# Patient Record
Sex: FEMALE | Race: WHITE | NOT HISPANIC OR LATINO | URBAN - METROPOLITAN AREA
[De-identification: names, ages, dates, MRNs, and addresses within clinical notes are randomized per-mention and may not be internally consistent; named-entity substitution may affect disease eponyms.]

---

## 2017-04-04 ENCOUNTER — ALLSCRIPTS OFFICE VISIT (OUTPATIENT)
Dept: OTHER | Facility: OTHER | Age: 37
End: 2017-04-04

## 2017-05-05 DIAGNOSIS — F41.1 GENERALIZED ANXIETY DISORDER: ICD-10-CM

## 2017-05-05 DIAGNOSIS — R00.2 PALPITATIONS: ICD-10-CM

## 2017-10-06 ENCOUNTER — ALLSCRIPTS OFFICE VISIT (OUTPATIENT)
Dept: OTHER | Facility: OTHER | Age: 37
End: 2017-10-06

## 2017-10-07 NOTE — PROGRESS NOTES
Assessment  1  Palpitations (785 1) (R00 2)   2  Generalized anxiety disorder (300 02) (F41 1)   3  Mixed hyperlipidemia (272 2) (E78 2)    Plan  Mixed hyperlipidemia    · Follow-up visit in 6 months Evaluation and Treatment  Follow-up  Status: Hold For -  Scheduling  Requested for: 35EVB3434   · Begin or continue regular aerobic exercise  Gradually work up to at least 3 sessions of  30 minutes of exercise a week ; Status:Complete;   Done: 13ADW0065 08:42AM   · Continue with our present treatment plan ; Status:Complete;   Done: 69NBZ9050 08:42AM   · We recommend that you follow the Mediterranean diet ; Status:Complete;   Done:  56SWV1069 08:42AM    Check Fasting lipid profile, CMP, CBC, TSH next visit     Discussion/Summary    Doing well, no change in mgt  Discussed stress relief modalities, importance of social network of support which she can access as needed  History of Present Illness  The patient states her hyperlipidemia has been stable since the last visit  She has no comorbid illnesses  She has no significant interval events  Symptoms:   Medications: The patient is not currently on any medications for her hyperlipidemia  The patient is being seen for follow-up of generalized anxiety disorder  The patient reports doing well  She has no comorbid illnesses  Interval symptoms:  improved anxiety,-denies difficulty concentrating,-denies restlessness,-denies panic attacks,-denies sleep disruption-and-denies depression  43 y/o WF for f/u [facility], Hyperlipidemia  Doing well, no acute c/o today  She reports having started to behave by improved diet and walking for exercise  She continues to b bothered by LBP  Stressors are stable, two teens at home, job is good      Review of Systems    Constitutional: No fever, no chills, feels well, no tiredness, no recent weight gain or weight loss  Eyes: No complaints of eye pain, no red eyes, no eyesight problems, no discharge, no dry eyes, no itching of eyes     ENT: no complaints of earache, no loss of hearing, no nose bleeds, no nasal discharge, no sore throat, no hoarseness  Cardiovascular: No complaints of slow heart rate, no fast heart rate, no chest pain, no palpitations, no leg claudication, no lower extremity edema  Respiratory: No complaints of shortness of breath, no wheezing, no cough, no SOB on exertion, no orthopnea, no PND  Gastrointestinal: No complaints of abdominal pain, no constipation, no nausea or vomiting, no diarrhea, no bloody stools  Genitourinary: No complaints of dysuria, no incontinence, no pelvic pain, no dysmenorrhea, no vaginal discharge or bleeding  Musculoskeletal: No complaints of arthralgias, no myalgias, no joint swelling or stiffness, no limb pain or swelling  Integumentary: No complaints of skin rash or lesions, no itching, no skin wounds, no breast pain or lump  Neurological: No complaints of headache, no confusion, no convulsions, no numbness, no dizziness or fainting, no tingling, no limb weakness, no difficulty walking  Psychiatric: Not suicidal, no sleep disturbance, no anxiety or depression, no change in personality, no emotional problems  Endocrine: No complaints of proptosis, no hot flashes, no muscle weakness, no deepening of the voice, no feelings of weakness  Hematologic/Lymphatic: No complaints of swollen glands, no swollen glands in the neck, does not bleed easily, does not bruise easily  ROS reviewed  Active Problems  1  Generalized anxiety disorder (300 02) (F41 1)   2  Palpitations (785 1) (R00 2)   3  Ulcerative colitis (556 9) (K51 90)    Past Medical History  1  History of herpes simplex infection (V12 09) (Z86 19)    The active problems and past medical history were reviewed and updated today  Surgical History  1  Denied: History Of Prior Surgery    The surgical history was reviewed and updated today  Family History  Mother    1  Family history of COPD, severe   2   Family history of diabetes mellitus (V18 0) (Z83 3)   3  Family history of hypercholesterolemia (V18 19) (Z83 42)   4  Family history of hypertension (V17 49) (Z82 49)  Father    5  Family history of Coronary artery disease    The family history was reviewed and updated today  Social History   · Full-time employment   · Lives with children   · Lives with spouse   ·    · Never a smoker   · Occupation  The social history was reviewed and updated today  The social history was reviewed and is unchanged  Current Meds   1  Acanya 1 2-2 5 % External Gel; Therapy: (Recorded:09Jan2017) to Recorded   2  Aldactone 25 MG Oral Tablet; TAKE 1 TABLET DAILY; Therapy: (Recorded:09Jan2017) to Recorded   3  Clobetasol Propionate 0 05 % External Cream;   Therapy: (Recorded:09Jan2017) to Recorded   4  Epiduo 0 1-2 5 % External Gel; Therapy: (TFJJZPJC:40CWQ8702) to Recorded   5  Mometasone Furoate 50 MCG/ACT Nasal Suspension; USE 2 SPRAYS IN EACH   NOSTRIL ONCE DAILY; Therapy: 71CBV8767 to (Evaluate:14Jan2017)  Requested for: 26JZV8527; Last   Rx:09Jan2017 Ordered   6  Venlafaxine HCl - 75 MG Oral Tablet; TAKE 1 TABLET DAILY; Therapy: (Recorded:09Jan2017) to Recorded    Allergies  1  Levaquin   2  Sulfa Drugs    Vitals  Vital Signs    Recorded: 56GTS8615 12:96HN   Systolic 443   Diastolic 68   Weight 393 lb    BMI Calculated 28 87   BSA Calculated 1 77     Physical Exam    Constitutional   General appearance: No acute distress, well appearing and well nourished  Eyes   Conjunctiva and lids: No swelling, erythema or discharge  Pupils and irises: Equal, round and reactive to light  Pulmonary   Respiratory effort: No increased work of breathing or signs of respiratory distress  Auscultation of lungs: Clear to auscultation  Cardiovascular   Auscultation of heart: Normal rate and rhythm, normal S1 and S2, without murmurs      Examination of extremities for edema and/or varicosities: Normal     Abdomen   Abdomen: Non-tender, no masses  Liver and spleen: No hepatomegaly or splenomegaly  Lymphatic   Palpation of lymph nodes in neck: No lymphadenopathy  Musculoskeletal   Gait and station: Normal     Inspection/palpation of joints, bones, and muscles: Normal     Skin   Skin and subcutaneous tissue: Normal without rashes or lesions  Neurologic   Cranial nerves: Cranial nerves 2-12 intact  Psychiatric   Orientation to person, place, and time: Normal     Mood and affect: Normal          Results/Data  PHQ-2 Adult Depression Screening 67YTT8500 08:10AM User, [facility]     Test Name Result Flag Reference   PHQ-2 Adult Depression Score 0     Over the last two weeks, how often have you been bothered by any of the following problems?   Little interest or pleasure in doing things: Not at all - 0  Feeling down, depressed, or hopeless: Not at all - 0   PHQ-2 Adult Depression Screening Negative         Signatures   Electronically signed by : DAYANNA Staley ; Oct  6 2017  8:43AM EST                       (Author)

## 2018-01-15 VITALS
DIASTOLIC BLOOD PRESSURE: 62 MMHG | HEIGHT: 63 IN | SYSTOLIC BLOOD PRESSURE: 100 MMHG | WEIGHT: 170 LBS | BODY MASS INDEX: 30.12 KG/M2

## 2018-01-15 VITALS — DIASTOLIC BLOOD PRESSURE: 68 MMHG | SYSTOLIC BLOOD PRESSURE: 100 MMHG | WEIGHT: 163 LBS

## 2018-01-26 DIAGNOSIS — F41.9 ANXIETY: Primary | ICD-10-CM

## 2018-01-26 RX ORDER — VENLAFAXINE 75 MG/1
1 TABLET ORAL DAILY
COMMUNITY
End: 2018-01-26 | Stop reason: SDUPTHER

## 2018-01-26 RX ORDER — VENLAFAXINE 75 MG/1
75 TABLET ORAL DAILY
Qty: 30 TABLET | Refills: 5 | Status: SHIPPED | OUTPATIENT
Start: 2018-01-26

## 2023-02-07 DIAGNOSIS — Z00.00 ENCOUNTER FOR PREVENTIVE HEALTH EXAMINATION: ICD-10-CM

## 2023-02-07 DIAGNOSIS — Z00.00 ENCOUNTER FOR PREVENTIVE HEALTH EXAMINATION: Primary | ICD-10-CM

## 2023-04-11 PROBLEM — E66.811 CLASS 1 OBESITY DUE TO EXCESS CALORIES WITHOUT SERIOUS COMORBIDITY WITH BODY MASS INDEX (BMI) OF 32.0 TO 32.9 IN ADULT: Status: ACTIVE | Noted: 2023-04-11

## 2023-04-11 PROBLEM — B00.2 ORAL HERPES: Status: ACTIVE | Noted: 2018-02-21

## 2023-04-11 PROBLEM — Z86.19 H/O COLD SORES: Status: ACTIVE | Noted: 2023-04-11

## 2023-04-11 PROBLEM — Z87.19 HISTORY OF IBS: Status: ACTIVE | Noted: 2017-03-27

## 2023-04-11 PROBLEM — L70.9 ACNE: Status: ACTIVE | Noted: 2023-04-11

## 2023-04-11 PROBLEM — G89.29 CHRONIC LOW BACK PAIN: Status: ACTIVE | Noted: 2023-04-11

## 2023-04-11 PROBLEM — F41.9 ANXIETY: Status: ACTIVE | Noted: 2023-04-11

## 2023-04-11 PROBLEM — M54.50 CHRONIC LOW BACK PAIN: Status: ACTIVE | Noted: 2023-04-11

## 2023-04-11 PROBLEM — N92.3 INTERMENSTRUAL BLEEDING: Status: ACTIVE | Noted: 2017-03-27

## 2023-04-11 PROBLEM — E66.09 CLASS 1 OBESITY DUE TO EXCESS CALORIES WITHOUT SERIOUS COMORBIDITY WITH BODY MASS INDEX (BMI) OF 32.0 TO 32.9 IN ADULT: Status: ACTIVE | Noted: 2023-04-11

## 2023-04-11 PROBLEM — E55.9 VITAMIN D DEFICIENCY: Status: ACTIVE | Noted: 2023-04-11

## 2023-04-11 PROBLEM — E78.2 MIXED HYPERLIPIDEMIA: Status: ACTIVE | Noted: 2023-04-11

## 2023-04-11 PROBLEM — Z00.00 WELL ADULT EXAM: Status: ACTIVE | Noted: 2023-04-11

## 2023-04-11 PROBLEM — F41.1 GENERALIZED ANXIETY DISORDER: Status: ACTIVE | Noted: 2017-01-09

## 2023-04-11 PROBLEM — K51.919 ULCERATIVE COLITIS WITH COMPLICATION (HCC): Status: ACTIVE | Noted: 2023-04-11

## 2023-04-11 PROBLEM — R00.2 PALPITATIONS: Status: ACTIVE | Noted: 2017-01-09

## 2023-06-10 PROBLEM — Z00.00 WELL ADULT EXAM: Status: RESOLVED | Noted: 2023-04-11 | Resolved: 2023-06-10

## 2024-08-12 ENCOUNTER — OFFICE VISIT (OUTPATIENT)
Dept: OBGYN CLINIC | Facility: CLINIC | Age: 44
End: 2024-08-12
Payer: COMMERCIAL

## 2024-08-12 ENCOUNTER — NURSE TRIAGE (OUTPATIENT)
Age: 44
End: 2024-08-12

## 2024-08-12 VITALS
DIASTOLIC BLOOD PRESSURE: 90 MMHG | WEIGHT: 200 LBS | BODY MASS INDEX: 35.44 KG/M2 | HEIGHT: 63 IN | HEART RATE: 84 BPM | SYSTOLIC BLOOD PRESSURE: 124 MMHG

## 2024-08-12 DIAGNOSIS — B37.31 CANDIDIASIS OF VAGINA: ICD-10-CM

## 2024-08-12 DIAGNOSIS — N89.8 VAGINAL IRRITATION: ICD-10-CM

## 2024-08-12 DIAGNOSIS — R30.0 BURNING WITH URINATION: ICD-10-CM

## 2024-08-12 DIAGNOSIS — N89.8 VAGINAL DISCHARGE: Primary | ICD-10-CM

## 2024-08-12 LAB
BACTERIA UR QL AUTO: ABNORMAL /HPF
BILIRUB UR QL STRIP: NEGATIVE
BV WHIFF TEST VAG QL: ABNORMAL
CLARITY UR: CLEAR
CLUE CELLS SPEC QL WET PREP: ABNORMAL
COLOR UR: COLORLESS
GLUCOSE UR STRIP-MCNC: NEGATIVE MG/DL
HGB UR QL STRIP.AUTO: NEGATIVE
KETONES UR STRIP-MCNC: NEGATIVE MG/DL
LEUKOCYTE ESTERASE UR QL STRIP: ABNORMAL
NITRITE UR QL STRIP: NEGATIVE
NON-SQ EPI CELLS URNS QL MICRO: ABNORMAL /HPF
PH SMN: 4.5 [PH]
PH UR STRIP.AUTO: 6 [PH]
PROT UR STRIP-MCNC: NEGATIVE MG/DL
RBC #/AREA URNS AUTO: ABNORMAL /HPF
SP GR UR STRIP.AUTO: 1.01 (ref 1–1.03)
T VAGINALIS VAG QL WET PREP: ABNORMAL
UROBILINOGEN UR STRIP-ACNC: <2 MG/DL
WBC #/AREA URNS AUTO: ABNORMAL /HPF
YEAST VAG QL WET PREP: ABNORMAL

## 2024-08-12 PROCEDURE — 87660 TRICHOMONAS VAGIN DIR PROBE: CPT | Performed by: OBSTETRICS & GYNECOLOGY

## 2024-08-12 PROCEDURE — 99214 OFFICE O/P EST MOD 30 MIN: CPT | Performed by: OBSTETRICS & GYNECOLOGY

## 2024-08-12 PROCEDURE — 87510 GARDNER VAG DNA DIR PROBE: CPT | Performed by: OBSTETRICS & GYNECOLOGY

## 2024-08-12 PROCEDURE — 87480 CANDIDA DNA DIR PROBE: CPT | Performed by: OBSTETRICS & GYNECOLOGY

## 2024-08-12 PROCEDURE — 87210 SMEAR WET MOUNT SALINE/INK: CPT | Performed by: OBSTETRICS & GYNECOLOGY

## 2024-08-12 PROCEDURE — 87086 URINE CULTURE/COLONY COUNT: CPT | Performed by: OBSTETRICS & GYNECOLOGY

## 2024-08-12 PROCEDURE — 81001 URINALYSIS AUTO W/SCOPE: CPT | Performed by: OBSTETRICS & GYNECOLOGY

## 2024-08-12 RX ORDER — CLOTRIMAZOLE AND BETAMETHASONE DIPROPIONATE 10; .64 MG/G; MG/G
CREAM TOPICAL 2 TIMES DAILY
Qty: 30 G | Refills: 2 | Status: SHIPPED | OUTPATIENT
Start: 2024-08-12

## 2024-08-12 RX ORDER — FLUCONAZOLE 150 MG/1
150 TABLET ORAL
Qty: 3 TABLET | Refills: 0 | Status: SHIPPED | OUTPATIENT
Start: 2024-08-12 | End: 2024-08-19

## 2024-08-12 NOTE — PROGRESS NOTES
Assessment/Plan:  Problem List Items Addressed This Visit    None  Visit Diagnoses       Vaginal discharge    -  Primary    Relevant Orders    POCT wet mount    VAGINOSIS DNA PROBE    Urinalysis with microscopic    Urine culture    Vaginal irritation        Relevant Orders    POCT wet mount    VAGINOSIS DNA PROBE    Urinalysis with microscopic    Urine culture    Burning with urination        Relevant Orders    POCT wet mount    VAGINOSIS DNA PROBE    Urinalysis with microscopic    Urine culture    Candidiasis of vagina        Relevant Medications    clotrimazole-betamethasone (LOTRISONE) 1-0.05 % cream    fluconazole (DIFLUCAN) 150 mg tablet             Candida Vaginitis      Candida vaginitis diagnosed on exam today based on symptoms and clinical findings.  Discussed with patient diagnosis in detail including risk factors, signs and symptoms.  Treatment was indicated for relief of symptoms and was prescribed antibiotics.  Discussed with patient avoidance of intercourse during treatment.    Antibiotics were prescribed and patient advised to complete course of antibiotics.  She was asked to call if with untoward side effects and if no relief of symptoms after treatment.  Follow-up is unnecessary if symptoms resolve.    Lotrisone cream was also prescribed for external irritation.  Urinalysis with urine culture was also obtained due to burning with urination.  We will call patient with test results      Subjective   Patient ID: June Panda is a 44 y.o. female.    Patient is here for a problem visit.    Chief Complaint   Patient presents with    Gynecologic Exam     Last PAP 1/14/22 NIL:M neg HPV  Had yeast infection in July, self medicated with vagisil.  She reports symptoms of itching and discomfort with intercourse, some burning with urination.  She reports some discharge. She has had some odor. She has some yellowish discharge.  Sexually active, same partner x 30 years        Menstrual History:  OB History           2    Para   2    Term   1       1    AB        Living   2         SAB        IAB        Ectopic        Multiple        Live Births   2           Obstetric Comments   Menarche 12                 Patient's last menstrual period was 2024 (approximate).         Past Medical History:   Diagnosis Date    Acne     Anxiety     Chronic low back pain     Herpes simplex infection     Hyperlipidemia     Ulcerative colitis (HCC)        Past Surgical History:   Procedure Laterality Date    HEMORRHOID SURGERY  2010    WISDOM TOOTH EXTRACTION Bilateral 2000       Social History     Tobacco Use    Smoking status: Never    Smokeless tobacco: Never   Vaping Use    Vaping status: Never Used   Substance Use Topics    Alcohol use: Yes     Comment: Drink on very rare occasions    Drug use: Never        Allergies   Allergen Reactions    Levofloxacin Hives    Sulfa Antibiotics Hives         Current Outpatient Medications:     Calcium Carb-Cholecalciferol (CALCIUM 1000 + D PO), , Disp: , Rfl:     clindamycin (CLEOCIN T) 1 % lotion, APPLY DAILY TO FACE, Disp: , Rfl:     clotrimazole-betamethasone (LOTRISONE) 1-0.05 % cream, Apply topically 2 (two) times a day, Disp: 30 g, Rfl: 2    fluconazole (DIFLUCAN) 150 mg tablet, Take 1 tablet (150 mg total) by mouth every 3 (three) days for 3 doses, Disp: 3 tablet, Rfl: 0    mesalamine (LIALDA) 1.2 g EC tablet, Take 4,800 mg by mouth in the morning, Disp: , Rfl:     methocarbamol (ROBAXIN) 500 mg tablet, Take 500 mg by mouth 2 (two) times a day as needed, Disp: , Rfl:     multivitamin (THERAGRAN) TABS, Take 1 tablet by mouth daily, Disp: , Rfl:     Omega-3 Fatty Acids (fish oil) 1,000 mg, Take 1 g by mouth daily, Disp: , Rfl:     rosuvastatin (CRESTOR) 5 mg tablet, Take 5 mg by mouth daily, Disp: , Rfl:     spironolactone (ALDACTONE) 50 mg tablet, Take 50 mg by mouth 2 (two) times a day, Disp: , Rfl:     tretinoin (REFISSA) 0.05 % cream, APPLY A PEA SIZED AMOUNT TO ENTIRE  "FACE DAILY AS TOLERATED, Disp: , Rfl:     valACYclovir (VALTREX) 1,000 mg tablet, 2 tabs PO q 12h (x 2 doses) prn cold sores, Disp: 4 tablet, Rfl: 0    venlafaxine (EFFEXOR) 75 mg tablet, Take 1 tablet (75 mg total) by mouth daily, Disp: 30 tablet, Rfl: 5      Pertinent laboratory testing, imaging studies and prior external records reviewed    Review of Systems   Constitutional: Negative.    HENT: Negative.     Eyes: Negative.    Respiratory: Negative.     Cardiovascular: Negative.    Gastrointestinal: Negative.    Endocrine: Negative.    Genitourinary:         As noted in HPI   Musculoskeletal: Negative.    Skin: Negative.    Allergic/Immunologic: Negative.    Neurological: Negative.    Hematological: Negative.    Psychiatric/Behavioral: Negative.           /90   Pulse 84   Ht 5' 3\" (1.6 m)   Wt 90.7 kg (200 lb)   LMP 07/29/2024 (Approximate)   BMI 35.43 kg/m²       Physical Exam  Constitutional:       General: She is not in acute distress.     Appearance: She is well-developed.   Genitourinary:      Bladder and urethral meatus normal.      No lesions in the vagina.      Right Labia: No rash, tenderness, lesions, skin changes or Bartholin's cyst.     Left Labia: No tenderness, lesions, skin changes, Bartholin's cyst or rash.     Vaginal discharge present.      No vaginal erythema, tenderness or bleeding.      No vaginal prolapse present.     No vaginal atrophy present.       Right Adnexa: not tender, not full and no mass present.     Left Adnexa: not tender, not full and no mass present.     No cervical polyp.      Uterus is not enlarged or tender.      No uterine mass detected.     Pelvic exam was performed with patient in the lithotomy position.   Rectum:      No tenderness.   Cardiovascular:      Rate and Rhythm: Normal rate.   Pulmonary:      Effort: Pulmonary effort is normal.   Abdominal:      General: There is no distension.      Palpations: Abdomen is soft.      Tenderness: There is no abdominal " tenderness.   Neurological:      Mental Status: She is alert and oriented to person, place, and time.   Skin:     General: Skin is warm and dry.   Psychiatric:         Behavior: Behavior normal.   Vitals and nursing note reviewed. Exam conducted with a chaperone present.       Vulvar erythema           Future Appointments   Date Time Provider Department Center   9/17/2024  8:00 AM Ml Beard MD [facility]

## 2024-08-12 NOTE — TELEPHONE ENCOUNTER
"Spoke with patient who reports she had a yeast infection back in July, used OTC meds with relief.  She started Sat with vaginal itching vulvar irritation, burning when urinating. She would like to be seen in the office as this is unusual for her to get them so close together.  She also would like to transfer care closer to where she now lives.  Appointment made for today.  No further questions or concerns at this time.    Reason for Disposition   Patient wants to be seen    Answer Assessment - Initial Assessment Questions  1. SYMPTOM: \"What's the main symptom you're concerned about?\" (e.g., rash, itching, swelling, dryness)      Vaginal itching and burning where the itching is when urinating  3. ONSET: \"When did this  start?\"      Beginning of July had symptoms, used OTC meds with relief.  Started again on Sat.  4. PAIN: \"Is there any pain?\" If Yes, ask: \"How bad is it?\" (Scale: 1-10; mild, moderate, severe)    -  MILD (1-3): doesn't interfere with normal activities     -  MODERATE (4-7): interferes with normal activities (e.g., work or school) or awakens from sleep      -  SEVERE (8-10): excruciating pain, unable to do any normal activities      Disccomfort  5. CAUSE: \"What do you think is causing the symptoms?\"      unsure  6. OTHER SYMPTOMS: \"Do you have any other symptoms?\" (e.g., fever, vaginal bleeding, pain with urination)      denies  7. PREGNANCY: \"Is there any chance you are pregnant?\" \"When was your last menstrual period?\"      1 week ago    Protocols used: Vulvar Symptoms-ADULT-[place]    "

## 2024-08-13 LAB
BACTERIA UR CULT: NORMAL
CANDIDA RRNA VAG QL PROBE: DETECTED
G VAGINALIS RRNA GENITAL QL PROBE: NOT DETECTED
T VAGINALIS RRNA GENITAL QL PROBE: NOT DETECTED

## 2024-09-11 NOTE — PROGRESS NOTES
Assessment        Diagnoses and all orders for this visit:    Encntr for gyn exam (general) (routine) w/o abn findings    Encounter for screening mammogram for breast cancer  -     Mammo screening bilateral w 3d and cad; Future    Cervical cancer screening    Other orders  -     Liquid-based pap, screening             Plan      All questions answered.  Contraception: vasectomy.  Follow up in 1 year.  Mammogram.   PAP due in     Subjective      Jnue Panda is a 44 y.o. female who presents for annual exam.      Chief Complaint   Patient presents with    Gynecologic Exam     Pelvic US WNL 23      Last Pap: 22 NILM neg HPV  Last mammogram: 10/7/22  Colorectal cancer screening:  or 2020 - due to ff up with [facility]  Patient with ulcerative colitis      Current contraception: vasectomy  History of abnormal Pap smear: yes  History of abnormal mammogram: no  Family history of uterine or ovarian cancer: no  Family history of breast cancer: no  Family history of colon cancer: yes  Paternal GM colon CA  Father with multiple polyps      OB History    Para Term  AB Living   2 2 1 1 0 2   SAB IAB Ectopic Multiple Live Births   0 0 0 0 2      # Outcome Date GA Lbr [place]/2nd Weight Sex Type Anes PTL Lv   2 Term      Vag-Spont   CASEY   1       Vag-Spont   CASEY      Obstetric Comments   Menarche 12        Menstrual History:  OB History          2    Para   2    Term   1       1    AB        Living   2         SAB        IAB        Ectopic        Multiple        Live Births   2           Obstetric Comments   Menarche 12               Menarche age: 12  Patient's last menstrual period was 2024 (exact date).             Past Medical History:   Diagnosis Date    Acne     Anxiety     Chronic low back pain     Herpes simplex infection     Hyperlipidemia     Ulcerative colitis (HCC)      Past Surgical History:   Procedure Laterality Date    HEMORRHOID SURGERY  2010    WISDOM TOOTH  EXTRACTION Bilateral 2000     Family History   Problem Relation Age of Onset    COPD Mother         COPD, Severe    Diabetes Mother         Diabetes Mellitus    Hyperlipidemia Mother     Hypertension Mother     Asthma Mother     Thyroid disease Mother     Coronary artery disease Father     Heart failure Father         Difibrulator and replaced valve    Colon cancer Maternal Grandmother     Asthma Maternal Grandfather     Stroke Maternal Grandfather        Social History     Tobacco Use    Smoking status: Never    Smokeless tobacco: Never   Vaping Use    Vaping status: Never Used   Substance Use Topics    Alcohol use: Yes     Comment: Drink on very rare occasions    Drug use: Never          Current Outpatient Medications:     Calcium Carb-Cholecalciferol (CALCIUM 1000 + D PO), , Disp: , Rfl:     clindamycin (CLEOCIN T) 1 % lotion, APPLY DAILY TO FACE, Disp: , Rfl:     clotrimazole-betamethasone (LOTRISONE) 1-0.05 % cream, Apply topically 2 (two) times a day, Disp: 30 g, Rfl: 2    mesalamine (LIALDA) 1.2 g EC tablet, Take 4,800 mg by mouth in the morning, Disp: , Rfl:     methocarbamol (ROBAXIN) 500 mg tablet, Take 500 mg by mouth 2 (two) times a day as needed, Disp: , Rfl:     multivitamin (THERAGRAN) TABS, Take 1 tablet by mouth daily, Disp: , Rfl:     Omega-3 Fatty Acids (fish oil) 1,000 mg, Take 1 g by mouth daily, Disp: , Rfl:     rosuvastatin (CRESTOR) 5 mg tablet, Take 5 mg by mouth daily, Disp: , Rfl:     spironolactone (ALDACTONE) 50 mg tablet, Take 50 mg by mouth 2 (two) times a day, Disp: , Rfl:     tretinoin (REFISSA) 0.05 % cream, APPLY A PEA SIZED AMOUNT TO ENTIRE FACE DAILY AS TOLERATED, Disp: , Rfl:     valACYclovir (VALTREX) 1,000 mg tablet, 2 tabs PO q 12h (x 2 doses) prn cold sores, Disp: 4 tablet, Rfl: 0    venlafaxine (EFFEXOR) 75 mg tablet, Take 1 tablet (75 mg total) by mouth daily, Disp: 30 tablet, Rfl: 5    Allergies   Allergen Reactions    Levofloxacin Hives    Sulfa Antibiotics Hives  "          Review of Systems   Constitutional: Negative.    HENT: Negative.     Eyes: Negative.    Respiratory: Negative.     Cardiovascular: Negative.    Gastrointestinal: Negative.    Endocrine: Negative.    Genitourinary:         As noted in HPI   Musculoskeletal: Negative.    Skin: Negative.    Allergic/Immunologic: Negative.    Neurological: Negative.    Hematological: Negative.    Psychiatric/Behavioral: Negative.         /84 (BP Location: Right arm, Patient Position: Sitting, Cuff Size: Adult)   Pulse 78   Ht 5' 3\" (1.6 m)   Wt 90.8 kg (200 lb 3.2 oz)   LMP 09/13/2024 (Exact Date)   SpO2 96%   BMI 35.46 kg/m²         Physical Exam  Constitutional:       Appearance: She is well-developed.   Genitourinary:      Vulva, bladder and rectum normal.      No lesions in the vagina.      Genitourinary Comments:         Right Labia: No rash, tenderness, lesions, skin changes or Bartholin's cyst.     Left Labia: No tenderness, lesions, skin changes, Bartholin's cyst or rash.     No inguinal adenopathy present in the right or left side.     Vaginal bleeding (small amount) present.      No vaginal discharge or tenderness.      No vaginal prolapse present.     No vaginal atrophy present.       Right Adnexa: not tender, not full and no mass present.     Left Adnexa: not tender, not full and no mass present.     No cervical motion tenderness, friability, lesion or polyp.      Uterus is not enlarged or tender.      Pelvic exam was performed with patient in the lithotomy position.   Rectum:      No external hemorrhoid.   Breasts:     Right: No mass, nipple discharge, skin change or tenderness.      Left: No mass, nipple discharge, skin change or tenderness.   HENT:      Head: Normocephalic.      Nose: Nose normal.   Eyes:      Conjunctiva/sclera: Conjunctivae normal.   Neck:      Thyroid: No thyromegaly.   Cardiovascular:      Rate and Rhythm: Normal rate and regular rhythm.      Heart sounds: Normal heart sounds. No " murmur heard.  Pulmonary:      Effort: Pulmonary effort is normal. No respiratory distress.      Breath sounds: Normal breath sounds. No wheezing or rales.   Abdominal:      General: There is no distension.      Palpations: Abdomen is soft. There is no mass.      Tenderness: There is no abdominal tenderness. There is no guarding or rebound.   Musculoskeletal:         General: No tenderness.      Cervical back: Neck supple. No muscular tenderness.   Lymphadenopathy:      Cervical: No cervical adenopathy.      Lower Body: No right inguinal adenopathy. No left inguinal adenopathy.   Neurological:      Mental Status: She is alert and oriented to person, place, and time.   Skin:     General: Skin is warm and dry.   Psychiatric:         Mood and Affect: Mood normal.         Behavior: Behavior normal.             No future appointments.

## 2024-09-17 ENCOUNTER — ANNUAL EXAM (OUTPATIENT)
Dept: OBGYN CLINIC | Facility: CLINIC | Age: 44
End: 2024-09-17
Payer: COMMERCIAL

## 2024-09-17 VITALS
BODY MASS INDEX: 35.47 KG/M2 | SYSTOLIC BLOOD PRESSURE: 112 MMHG | HEIGHT: 63 IN | HEART RATE: 78 BPM | OXYGEN SATURATION: 96 % | DIASTOLIC BLOOD PRESSURE: 84 MMHG | WEIGHT: 200.2 LBS

## 2024-09-17 DIAGNOSIS — Z12.31 ENCOUNTER FOR SCREENING MAMMOGRAM FOR BREAST CANCER: ICD-10-CM

## 2024-09-17 DIAGNOSIS — Z12.4 CERVICAL CANCER SCREENING: ICD-10-CM

## 2024-09-17 DIAGNOSIS — Z01.419 ENCNTR FOR GYN EXAM (GENERAL) (ROUTINE) W/O ABN FINDINGS: Primary | ICD-10-CM

## 2024-09-17 PROCEDURE — S0612 ANNUAL GYNECOLOGICAL EXAMINA: HCPCS | Performed by: OBSTETRICS & GYNECOLOGY

## 2025-07-08 ENCOUNTER — TELEPHONE (OUTPATIENT)
Dept: OBGYN CLINIC | Facility: CLINIC | Age: 45
End: 2025-07-08